# Patient Record
Sex: FEMALE | Race: BLACK OR AFRICAN AMERICAN | NOT HISPANIC OR LATINO | URBAN - METROPOLITAN AREA
[De-identification: names, ages, dates, MRNs, and addresses within clinical notes are randomized per-mention and may not be internally consistent; named-entity substitution may affect disease eponyms.]

---

## 2018-04-16 ENCOUNTER — HOSPITAL ENCOUNTER (EMERGENCY)
Facility: HOSPITAL | Age: 23
Discharge: HOME | End: 2018-04-16
Attending: EMERGENCY MEDICINE
Payer: COMMERCIAL

## 2018-04-16 VITALS
OXYGEN SATURATION: 99 % | SYSTOLIC BLOOD PRESSURE: 135 MMHG | BODY MASS INDEX: 28.44 KG/M2 | WEIGHT: 210 LBS | HEART RATE: 69 BPM | HEIGHT: 72 IN | DIASTOLIC BLOOD PRESSURE: 71 MMHG | TEMPERATURE: 97.8 F | RESPIRATION RATE: 16 BRPM

## 2018-04-16 DIAGNOSIS — F32.A DEPRESSION, UNSPECIFIED DEPRESSION TYPE: Primary | ICD-10-CM

## 2018-04-16 LAB
ANION GAP SERPL CALC-SCNC: 7 MEQ/L (ref 3–15)
APAP SERPL-MCNC: <10 UG/ML (ref 10–30)
BASOPHILS # BLD: 0.02 K/UL (ref 0.01–0.1)
BASOPHILS NFR BLD: 0.3 %
BUN SERPL-MCNC: 11 MG/DL (ref 8–20)
CALCIUM SERPL-MCNC: 9.1 MG/DL (ref 8.9–10.3)
CHLORIDE SERPL-SCNC: 105 MMOL/L (ref 98–109)
CO2 SERPL-SCNC: 25 MMOL/L (ref 22–32)
CREAT SERPL-MCNC: 1 MG/DL (ref 0.6–1.1)
DIFFERENTIAL METHOD BLD: ABNORMAL
EOSINOPHIL # BLD: 0.07 K/UL (ref 0.04–0.36)
EOSINOPHIL NFR BLD: 1.1 %
ERYTHROCYTE [DISTWIDTH] IN BLOOD BY AUTOMATED COUNT: 12.5 % (ref 11.7–14.4)
ETHANOL SERPL-MCNC: <5 MG/DL
GFR SERPL CREATININE-BSD FRML MDRD: >60 ML/MIN/1.73M*2
GLUCOSE SERPL-MCNC: 103 MG/DL (ref 70–99)
HCG UR QL: NEGATIVE
HCT VFR BLDCO AUTO: 40.9 % (ref 35–45)
HGB BLD-MCNC: 14.2 G/DL (ref 11.8–15.7)
IMM GRANULOCYTES # BLD AUTO: 0.02 K/UL (ref 0–0.08)
IMM GRANULOCYTES NFR BLD AUTO: 0.3 %
LYMPHOCYTES # BLD: 2.13 K/UL (ref 1.2–3.5)
LYMPHOCYTES NFR BLD: 34.9 %
MCH RBC QN AUTO: 33.4 PG (ref 28–33.2)
MCHC RBC AUTO-ENTMCNC: 34.7 G/DL (ref 32.2–35.5)
MCV RBC AUTO: 96.2 FL (ref 83–98)
MONOCYTES # BLD: 0.38 K/UL (ref 0.28–0.8)
MONOCYTES NFR BLD: 6.2 %
NEUTROPHILS # BLD: 3.48 K/UL (ref 1.7–7)
NEUTS SEG NFR BLD: 57.2 %
NRBC BLD-RTO: 0.2 %
PDW BLD AUTO: 10.8 FL (ref 9.4–12.3)
PLATELET # BLD AUTO: 176 K/UL (ref 150–369)
POTASSIUM SERPL-SCNC: 3.9 MMOL/L (ref 3.6–5.1)
RBC # BLD AUTO: 4.25 M/UL (ref 3.93–5.22)
SALICYLATES SERPL-MCNC: <4 MG/DL
SODIUM SERPL-SCNC: 137 MMOL/L (ref 136–144)
WBC # BLD AUTO: 6.1 K/UL (ref 3.8–10.5)

## 2018-04-16 PROCEDURE — 80048 BASIC METABOLIC PNL TOTAL CA: CPT | Performed by: PHYSICIAN ASSISTANT

## 2018-04-16 PROCEDURE — 84703 CHORIONIC GONADOTROPIN ASSAY: CPT | Performed by: PHYSICIAN ASSISTANT

## 2018-04-16 PROCEDURE — 36415 COLL VENOUS BLD VENIPUNCTURE: CPT | Performed by: PHYSICIAN ASSISTANT

## 2018-04-16 PROCEDURE — 85025 COMPLETE CBC W/AUTO DIFF WBC: CPT | Performed by: PHYSICIAN ASSISTANT

## 2018-04-16 PROCEDURE — 99285 EMERGENCY DEPT VISIT HI MDM: CPT

## 2018-04-16 PROCEDURE — G0480 DRUG TEST DEF 1-7 CLASSES: HCPCS | Performed by: PHYSICIAN ASSISTANT

## 2018-04-16 RX ORDER — BUPROPION HYDROCHLORIDE 150 MG/1
150 TABLET ORAL DAILY
COMMUNITY

## 2018-04-16 RX ORDER — FLUOXETINE HYDROCHLORIDE 20 MG/1
80 CAPSULE ORAL DAILY
COMMUNITY

## 2018-04-16 ASSESSMENT — ENCOUNTER SYMPTOMS
DEPRESSED MOOD: 1
DYSPHORIC MOOD: 0
EYE PAIN: 0
SHORTNESS OF BREATH: 0
ARTHRALGIAS: 0
HYPERSOMNIA: 0
NERVOUS/ANXIOUS: 0
FEELINGS OF WORTHLESSNESS: 0
INSOMNIA: 0
SORE THROAT: 0
HYPERACTIVE: 0
CHILLS: 0
FATIGUE: 0
IRRITABILITY: 0
HEADACHES: 0
AGITATION: 0
HYPERVENTILATION: 0
ABDOMINAL PAIN: 0
DYSURIA: 0
BACK PAIN: 0
APPETITE CHANGE: 0
EUPHORIC MOOD: 0
PALPITATIONS: 0
COUGH: 0
SLEEP DISTURBANCE: 0
POOR JUDGMENT: 0
VOMITING: 0
COLOR CHANGE: 0
FEVER: 0
HALLUCINATIONS: 0
DECREASED NEED FOR SLEEP: 0
SEIZURES: 0
CONFUSION: 0
HEMATURIA: 0
DECREASED CONCENTRATION: 0

## 2018-04-16 ASSESSMENT — COGNITIVE AND FUNCTIONAL STATUS - GENERAL
MOOD: ANXIOUS;DEPRESSED
AFFECT: RESTRICTED
PSYCHOMOTOR FUNCTIONING: WNL
PERCEPTUAL FUNCTION: NORMAL
ORIENTATION: FULLY ORIENTED
IMPULSE CONTROL: IMPETUOUS
SPEECH: SOFT
THOUGHT_CONTENT: APPROPRIATE
JUDGEMENT: INTACT
CURRENT VIOLENT THOUGHTS OR BEHAVIOR: PATIENT DENIES
APPEARANCE: WELL GROOMED
INSIGHT: INTACT

## 2018-04-16 NOTE — ED PROVIDER NOTES
HPI     Chief Complaint   Patient presents with   • Depression     Depression       History provided by:  Patient  Mental Health Problem   Presenting symptoms: depression and self-mutilation    Presenting symptoms: no agitation, no hallucinations and no suicidal thoughts    Patient accompanied by: roommate.  Degree of incapacity (severity):  Moderate  Onset quality:  Sudden  Duration:  1 day  Timing:  Constant  Progression:  Unchanged  Chronicity:  Recurrent  Context: not alcohol use, not drug abuse, not medication, not noncompliant and not recent medication change    Relieved by:  None tried  Worsened by:  Nothing  Associated symptoms: no abdominal pain, no anhedonia, no anxiety, no appetite change, no chest pain, no decreased need for sleep, not distractible, no euphoric mood, no fatigue, no feelings of worthlessness, no headaches, no hypersomnia, no hyperventilation, no insomnia, no irritability, no poor judgment and no school problems    Risk factors: no hx of suicide attempts      Patient states she was feeling overwhelmed and was therefore poking herself with her scissors in an attempt to harm herself, but not to kill herself. She reports history of suicidal ideation, most recently one year ago, but denies ever having plan to hurt herself.   Additionally, Pt reports having homicidal ideation toward her roommate (who is with her in Ed) one month ago, at which time she had plan to strangle her. Pt denies current HI and roommate, in room per patient's preference during my evaluation, is aware.     Patient History     Past Medical History:   Diagnosis Date   • Anemia    • Anxiety    • Depression        History reviewed. No pertinent surgical history.    Family History   Problem Relation Age of Onset   • Depression Mother    • Anxiety disorder Mother    • Depression Mother's Brother    • Drug abuse Mother's Brother        Social History   Substance Use Topics   • Smoking status: Never Smoker   • Smokeless tobacco:  Never Used   • Alcohol use Yes      Comment: The patient reports a history of occasional drinking stating she had two drinks three months ago.  She reported she first tried alcohol at the age of 17yo.        Systems Reviewed from Nursing Triage:          Review of Systems     Review of Systems   Constitutional: Negative for appetite change, chills, fatigue, fever and irritability.   HENT: Negative for ear pain and sore throat.    Eyes: Negative for pain and visual disturbance.   Respiratory: Negative for cough and shortness of breath.    Cardiovascular: Negative for chest pain and palpitations.   Gastrointestinal: Negative for abdominal pain and vomiting.   Genitourinary: Negative for dysuria and hematuria.   Musculoskeletal: Negative for arthralgias and back pain.   Skin: Negative for color change and rash.   Neurological: Negative for seizures, syncope and headaches.   Psychiatric/Behavioral: Positive for self-injury. Negative for agitation, behavioral problems, confusion, decreased concentration, dysphoric mood, hallucinations, sleep disturbance and suicidal ideas. The patient is not nervous/anxious, does not have insomnia and is not hyperactive.    All other systems reviewed and are negative.       Physical Exam     ED Triage Vitals [04/16/18 1412]   Temp Heart Rate Resp BP SpO2   36.6 °C (97.8 °F) 69 16 135/71 99 %      Temp Source Heart Rate Source Patient Position BP Location FiO2 (%) (Set)   Tympanic -- -- -- --                     Patient Vitals for the past 24 hrs:   BP Temp Temp src Pulse Resp SpO2 Height Weight   04/16/18 1412 135/71 36.6 °C (97.8 °F) Tympanic 69 16 99 % 1.829 m (6') 95.3 kg (210 lb)           Physical Exam   Constitutional: She is oriented to person, place, and time. She appears well-developed and well-nourished. No distress.   HENT:   Head: Normocephalic and atraumatic.   Eyes: Conjunctivae and EOM are normal. Pupils are equal, round, and reactive to light.   Neck: Normal range of  motion. Neck supple.   Cardiovascular: Normal rate, regular rhythm and normal heart sounds.    No murmur heard.  Pulmonary/Chest: Effort normal and breath sounds normal. No respiratory distress.   Abdominal: Soft. Bowel sounds are normal. She exhibits no distension. There is no tenderness. There is no guarding.   Musculoskeletal: Normal range of motion. She exhibits no edema.   Neurological: She is alert and oriented to person, place, and time.   Skin: Skin is warm and dry. Capillary refill takes less than 2 seconds.   Psychiatric: Her speech is normal. Judgment and thought content normal. Her affect is blunt. She is withdrawn. She is not actively hallucinating. Thought content is not paranoid and not delusional. Cognition and memory are normal. She expresses no homicidal and no suicidal ideation. She expresses no suicidal plans and no homicidal plans. She is attentive.   Nursing note and vitals reviewed.           Procedures    ED Course & MDM     Labs Reviewed   BASIC METABOLIC PANEL - Abnormal        Result Value    Glucose 103 (*)     Sodium 137      Potassium 3.9      Chloride 105      CO2 25      BUN 11      Creatinine 1.0      Calcium 9.1      eGFR >60.0      Anion Gap 7     ER TOXICOLOGY SCR, SERUM - Abnormal     Acetaminophen Level <10.0 (*)     Salicylate Lvl <4.0      Ethanol Lvl <5     CBC - Abnormal     MCH 33.4 (*)     WBC 6.10      RBC 4.25      Hemoglobin 14.2      Hematocrit 40.9      MCV 96.2      MCHC 34.7      RDW 12.5      Platelets 176      MPV 10.8     DIFF COUNT - Abnormal     nRBC 0.2 (*)     Differential Type Auto      Immature Granulocytes 0.3      Neutrophils 57.2      Lymphocytes 34.9      Monocytes 6.2      Eosinophils 1.1      Basophils 0.3      Immature Granulocytes, Absolute 0.02      Neutrophils, Absolute 3.48      Lymphocytes, Absolute 2.13      Monocytes, Absolute 0.38      Eosinophils, Absolute 0.07      Basophils, Absolute 0.02     BHCG, SERUM, QUAL - Normal    Preg Test, Serum  Negative     CBC AND DIFFERENTIAL    Narrative:     The following orders were created for panel order CBC and differential.  Procedure                               Abnormality         Status                     ---------                               -----------         ------                     CBC[[id number]]                           Abnormal            Final result               Diff Count[[id number]]                    Abnormal            Final result                 Please view results for these tests on the individual orders.       No orders to display           MDM         ED Course as of Apr 16 2144 Mon Apr 16, 2018   1450 D/w daniel Judge, she is aware of patient and will pass on to Otf at 3PM.   [KL]   1630 Patient has been evaluated by daniel Vanessa, and is set-up to begin IOP (MWF) at [facility] next Monday, April 23rd. Will d/c home at this time.  [KL]      ED Course User Index  [KL] TAMIKA Ford         Clinical Impressions as of Apr 16 2144   Depression, unspecified depression type     Disposition:  Discharge     TAMIKA Ford  04/16/18 2144

## 2018-04-16 NOTE — ED ATTESTATION NOTE
"I have personally seen and examined the patient.  I reviewed and agree with physician assistant / nurse practitioner’s assessment and plan of care, with the following exceptions: None  My examination, assessment, and plan of care of Migdalia Boss is as follows:    Presents with increasing depression, was poking self with scissors \"because I was disappointed that I wasn't handling the stress better\" denies Si. Reports 1 month ago, had HI towards her roommate, denies current thoughts of Hi. Roommate aware, and here with pt. Pt called her therapist today due to her increasing depression, and was told to come to ED for evaluation. Denies any SI/HI at this time, contracts for safetly.  Exam: Flattened affect, NCAT, PERRLA, EOMI  Extrem: Small punctate erythematous areas noted on right wrist, no violation of epidermis  Plan: Offered pt inpatient txt on voluntary basis, but pt declines, wishes for IOP. At this time, pt does not meet criteria for involuntary commitment. Evaluated by Psych SW today in Ed, and given referral info for IOP. D/w pt indications to return.     Melanie Murray MD  04/16/18 1900    "

## 2018-04-16 NOTE — DISCHARGE INSTRUCTIONS
Please return to the ED if you develop worsening symptoms, thoughts of hurting yourself or others, or any other concerning symptoms.    Follow up with outpatient program, as referred to by our crisis team.

## 2018-09-07 ENCOUNTER — LAB REQUISITION (OUTPATIENT)
Dept: LAB | Facility: HOSPITAL | Age: 23
End: 2018-09-07
Attending: INTERNAL MEDICINE
Payer: COMMERCIAL

## 2018-09-07 DIAGNOSIS — D50.0 IRON DEFICIENCY ANEMIA DUE TO CHRONIC BLOOD LOSS: ICD-10-CM

## 2018-09-07 LAB
BASOPHILS # BLD: 0.03 K/UL (ref 0.01–0.1)
BASOPHILS NFR BLD: 0.4 %
DIFFERENTIAL METHOD BLD: NORMAL
EOSINOPHIL # BLD: 0.14 K/UL (ref 0.04–0.36)
EOSINOPHIL NFR BLD: 2 %
ERYTHROCYTE [DISTWIDTH] IN BLOOD BY AUTOMATED COUNT: 12.2 % (ref 11.7–14.4)
HCT VFR BLDCO AUTO: 38.7 % (ref 35–45)
HGB BLD-MCNC: 13.4 G/DL (ref 11.8–15.7)
IMM GRANULOCYTES # BLD AUTO: 0.02 K/UL (ref 0–0.08)
IMM GRANULOCYTES NFR BLD AUTO: 0.3 %
LYMPHOCYTES # BLD: 2.41 K/UL (ref 1.2–3.5)
LYMPHOCYTES NFR BLD: 33.8 %
MCH RBC QN AUTO: 32.8 PG (ref 28–33.2)
MCHC RBC AUTO-ENTMCNC: 34.6 G/DL (ref 32.2–35.5)
MCV RBC AUTO: 94.9 FL (ref 83–98)
MONOCYTES # BLD: 0.52 K/UL (ref 0.28–0.8)
MONOCYTES NFR BLD: 7.3 %
NEUTROPHILS # BLD: 4.02 K/UL (ref 1.7–7)
NEUTS SEG NFR BLD: 56.2 %
NRBC BLD-RTO: 0 %
PDW BLD AUTO: 10.5 FL (ref 9.4–12.3)
PLATELET # BLD AUTO: 210 K/UL (ref 150–369)
RBC # BLD AUTO: 4.08 M/UL (ref 3.93–5.22)
WBC # BLD AUTO: 7.14 K/UL (ref 3.8–10.5)

## 2018-09-07 PROCEDURE — 85025 COMPLETE CBC W/AUTO DIFF WBC: CPT | Performed by: INTERNAL MEDICINE

## 2018-10-02 ENCOUNTER — HOSPITAL ENCOUNTER (EMERGENCY)
Facility: HOSPITAL | Age: 23
Discharge: HOME | End: 2018-10-02
Attending: EMERGENCY MEDICINE
Payer: COMMERCIAL

## 2018-10-02 VITALS
OXYGEN SATURATION: 96 % | WEIGHT: 215 LBS | BODY MASS INDEX: 29.12 KG/M2 | DIASTOLIC BLOOD PRESSURE: 62 MMHG | HEIGHT: 72 IN | SYSTOLIC BLOOD PRESSURE: 132 MMHG | HEART RATE: 76 BPM | TEMPERATURE: 97.9 F | RESPIRATION RATE: 18 BRPM

## 2018-10-02 DIAGNOSIS — M76.32 ILIOTIBIAL BAND SYNDROME, LEFT LEG: ICD-10-CM

## 2018-10-02 DIAGNOSIS — M79.605 LEFT LEG PAIN: Primary | ICD-10-CM

## 2018-10-02 LAB
BASOPHILS # BLD: 0.03 K/UL (ref 0.01–0.1)
BASOPHILS NFR BLD: 0.4 %
CRP SERPL-MCNC: <=6 MG/L
DIFFERENTIAL METHOD BLD: NORMAL
EOSINOPHIL # BLD: 0.07 K/UL (ref 0.04–0.36)
EOSINOPHIL NFR BLD: 1 %
ERYTHROCYTE [DISTWIDTH] IN BLOOD BY AUTOMATED COUNT: 12.9 % (ref 11.7–14.4)
HCT VFR BLDCO AUTO: 38.8 % (ref 35–45)
HGB BLD-MCNC: 13.3 G/DL (ref 11.8–15.7)
IMM GRANULOCYTES # BLD AUTO: 0.02 K/UL (ref 0–0.08)
IMM GRANULOCYTES NFR BLD AUTO: 0.3 %
LYMPHOCYTES # BLD: 2.47 K/UL (ref 1.2–3.5)
LYMPHOCYTES NFR BLD: 34.3 %
MCH RBC QN AUTO: 32.1 PG (ref 28–33.2)
MCHC RBC AUTO-ENTMCNC: 34.3 G/DL (ref 32.2–35.5)
MCV RBC AUTO: 93.7 FL (ref 83–98)
MONOCYTES # BLD: 0.58 K/UL (ref 0.28–0.8)
MONOCYTES NFR BLD: 8.1 %
NEUTROPHILS # BLD: 4.03 K/UL (ref 1.7–7)
NEUTS SEG NFR BLD: 55.9 %
NRBC BLD-RTO: 0 %
PDW BLD AUTO: 10.7 FL (ref 9.4–12.3)
PLATELET # BLD AUTO: 192 K/UL (ref 150–369)
RBC # BLD AUTO: 4.14 M/UL (ref 3.93–5.22)
WBC # BLD AUTO: 7.2 K/UL (ref 3.8–10.5)

## 2018-10-02 PROCEDURE — 86140 C-REACTIVE PROTEIN: CPT | Performed by: EMERGENCY MEDICINE

## 2018-10-02 PROCEDURE — 99283 EMERGENCY DEPT VISIT LOW MDM: CPT

## 2018-10-02 PROCEDURE — 85025 COMPLETE CBC W/AUTO DIFF WBC: CPT | Performed by: EMERGENCY MEDICINE

## 2018-10-02 PROCEDURE — 63700000 HC SELF-ADMINISTRABLE DRUG: Performed by: PHYSICIAN ASSISTANT

## 2018-10-02 PROCEDURE — 36415 COLL VENOUS BLD VENIPUNCTURE: CPT | Performed by: EMERGENCY MEDICINE

## 2018-10-02 RX ORDER — SODIUM FLUORIDE 1.1 G/100G
GEL ORAL
Refills: 4 | COMMUNITY
Start: 2018-08-22

## 2018-10-02 RX ORDER — IBUPROFEN 600 MG/1
600 TABLET ORAL ONCE
Status: COMPLETED | OUTPATIENT
Start: 2018-10-02 | End: 2018-10-02

## 2018-10-02 RX ORDER — TRAZODONE HYDROCHLORIDE 50 MG/1
1 TABLET ORAL NIGHTLY
Refills: 0 | COMMUNITY
Start: 2018-07-17 | End: 2019-04-11

## 2018-10-02 RX ADMIN — IBUPROFEN 600 MG: 600 TABLET ORAL at 07:56

## 2018-10-02 ASSESSMENT — ENCOUNTER SYMPTOMS
COLOR CHANGE: 0
WOUND: 0
FEVER: 0
CHILLS: 0
JOINT SWELLING: 0
WEAKNESS: 0
STIFFNESS: 0
MUSCLE WEAKNESS: 0
EXTREMITY NUMBNESS: 0
NUMBNESS: 0
FATIGUE: 0

## 2018-10-02 NOTE — ED PROVIDER NOTES
HPI     Chief Complaint   Patient presents with   • Knee Pain     L knee pain.  Had a cortisone shot in L knee last Friday, last night pain returned and woke pt from sleep.  Unable walk d/t pain     Pt is a 22yF with no pertinent PMH who presents to the ED with L knee/thigh pain. Pt reports she has been treated by her PCP and Dr. Ramos (referred by school) for IT band syndrome. She had injection performed by Dr. Ramos on Friday and states that she had been doing well since injection until this morning, when she awoke with significant pain and has had resultant difficulty weight bearing.     History provided by:  Patient  Knee Pain   Location:  Knee and leg  Injury: no    Leg location:  L upper leg  Pain details:     Quality:  Aching    Radiates to:  Does not radiate    Severity:  Moderate    Onset quality:  Sudden    Timing:  Constant    Progression:  Unchanged  Dislocation: no    Prior injury to area:  No  Relieved by:  None tried  Worsened by:  Nothing  Associated symptoms: decreased ROM (secondary to pain)    Associated symptoms: no fatigue, no fever, no itching, no muscle weakness, no numbness, no stiffness, no swelling and no tingling         Patient History     Past Medical History:   Diagnosis Date   • Anemia    • Anxiety    • Depression    • IT band syndrome        History reviewed. No pertinent surgical history.    Family History   Problem Relation Age of Onset   • Depression Mother    • Anxiety disorder Mother    • Depression Mother's Brother    • Drug abuse Mother's Brother        Social History   Substance Use Topics   • Smoking status: Never Smoker   • Smokeless tobacco: Never Used   • Alcohol use Yes      Comment: occ       Systems Reviewed from Nursing Triage:          Review of Systems     Review of Systems   Constitutional: Negative for chills, fatigue and fever.   Musculoskeletal: Negative for gait problem, joint swelling and stiffness.   Skin: Negative for color change, itching, pallor and wound.    Neurological: Negative for weakness and numbness.   All other systems reviewed and are negative.       Physical Exam     ED Triage Vitals   Temp Heart Rate Resp BP SpO2   10/02/18 0727 10/02/18 0727 10/02/18 0727 10/02/18 0727 10/02/18 0727   36.2 °C (97.2 °F) 71 16 137/86 99 %      Temp Source Heart Rate Source Patient Position BP Location FiO2 (%) (Set)   10/02/18 0727 10/02/18 0907 10/02/18 0727 10/02/18 0727 --   Tympanic Apical Sitting Right upper arm                      Patient Vitals for the past 24 hrs:   BP Temp Temp src Pulse Resp SpO2 Height Weight   10/02/18 0907 132/62 36.6 °C (97.9 °F) Tympanic 76 18 96 % - -   10/02/18 0727 137/86 36.2 °C (97.2 °F) Tympanic 71 16 99 % 1.829 m (6') 97.5 kg (215 lb)           Physical Exam   Constitutional: She is oriented to person, place, and time. She appears well-developed and well-nourished. No distress.   Musculoskeletal: Normal range of motion. She exhibits no edema.        Left hip: Normal. She exhibits normal range of motion, normal strength, no tenderness, no bony tenderness, no swelling, no crepitus, no deformity and no laceration.        Left knee: She exhibits normal range of motion (full AROM with mild discomfort), no swelling, no effusion, no ecchymosis, no deformity, no laceration, no erythema, normal alignment and no bony tenderness. No tenderness found.        Left upper leg: She exhibits tenderness (subjective, mild tenderness along IT band). She exhibits no bony tenderness, no swelling, no edema, no deformity and no laceration.        Legs:  Neurological: She is alert and oriented to person, place, and time.   Skin: Skin is warm and dry. Capillary refill takes less than 2 seconds.   Psychiatric: She has a normal mood and affect.   Nursing note and vitals reviewed.           Procedures    ED Course & MDM     Labs Reviewed   C-REACTIVE PROTEIN - Normal       Result Value    CRP <=6.00     CBC - Normal    WBC 7.20      RBC 4.14      Hemoglobin 13.3       Hematocrit 38.8      MCV 93.7      MCH 32.1      MCHC 34.3      RDW 12.9      Platelets 192      MPV 10.7     CBC AND DIFFERENTIAL    Narrative:     The following orders were created for panel order CBC and Differential.  Procedure                               Abnormality         Status                     ---------                               -----------         ------                     CBC[[id number]]                           Normal              Final result               Diff Count[[id number]]                                        Final result                 Please view results for these tests on the individual orders.   DIFF COUNT    Differential Type Auto      nRBC 0.0      Immature Granulocytes 0.3      Neutrophils 55.9      Lymphocytes 34.3      Monocytes 8.1      Eosinophils 1.0      Basophils 0.4      Immature Granulocytes, Absolute 0.02      Neutrophils, Absolute 4.03      Lymphocytes, Absolute 2.47      Monocytes, Absolute 0.58      Eosinophils, Absolute 0.07      Basophils, Absolute 0.03         No orders to display           MDM         ED Course as of Oct 02 0912   Tue Oct 02, 2018   0803 Imp: LLE pain s/p L knee injection.   Plan: CBC. CRP.   [KL]   0851 No acute findings on lab workup. Will d/c home with crutches, NSAIDs, to f/u with orthopedics.  [KL]      ED Course User Index  [KL] Theresa Campos PA         Clinical Impressions as of Oct 02 0912   Left leg pain   Iliotibial band syndrome, left leg     Disposition: Discharge       Theresa Campos PA  10/02/18 0913

## 2018-10-02 NOTE — ED NOTES
0910  Crutch training provided to pt by pct.  Pt discharged home     Sienna Little RN  10/02/18 0910

## 2018-10-02 NOTE — ED ATTESTATION NOTE
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  She had an injection last week by orthopedist and it helped for a day or so but has been hurting more and significantly more this morning without new injury.  He did practice on the leg yesterday.  Denies numbness tingling constitutional symptoms does not feel ill otherwise.  Examination: Alert pleasant young woman in no distress examination left leg shows some tenderness to palpation the lateral aspect of the left knee less so superior to that on the lateral aspect of the femur.  The left  knee is just ever so slightly warmer than the than the right.  There is no abscess, cellulitis she has full active range of motion with lateral knee pain.  Leg  is neurovascularly intact. Considered dvt, infection, others.      Martín Westfall MD  10/02/18 0963

## 2018-10-02 NOTE — DISCHARGE INSTRUCTIONS
Follow up with Dr. Ramos, regarding recent injection.    Please return to the ED if you develop worsening symptoms, pain uncontrolled by over the counter pain relievers, numbness, tingling, blue discoloration, or inability to move the affected extremity, or any other concerning symptoms.

## 2019-04-11 ENCOUNTER — HOSPITAL ENCOUNTER (EMERGENCY)
Facility: HOSPITAL | Age: 24
Discharge: HOME | End: 2019-04-11
Attending: EMERGENCY MEDICINE
Payer: COMMERCIAL

## 2019-04-11 VITALS
OXYGEN SATURATION: 98 % | WEIGHT: 230 LBS | DIASTOLIC BLOOD PRESSURE: 73 MMHG | HEART RATE: 73 BPM | RESPIRATION RATE: 16 BRPM | TEMPERATURE: 98.1 F | SYSTOLIC BLOOD PRESSURE: 140 MMHG | BODY MASS INDEX: 31.15 KG/M2 | HEIGHT: 72 IN

## 2019-04-11 DIAGNOSIS — F32.A DEPRESSION, UNSPECIFIED DEPRESSION TYPE: Primary | ICD-10-CM

## 2019-04-11 LAB
ANION GAP SERPL CALC-SCNC: 10 MEQ/L (ref 3–15)
APAP SERPL-MCNC: <10 UG/ML (ref 10–30)
BASOPHILS # BLD: 0.03 K/UL (ref 0.01–0.1)
BASOPHILS NFR BLD: 0.5 %
BUN SERPL-MCNC: 17 MG/DL (ref 8–20)
CALCIUM SERPL-MCNC: 8.9 MG/DL (ref 8.9–10.3)
CHLORIDE SERPL-SCNC: 105 MEQ/L (ref 98–109)
CO2 SERPL-SCNC: 22 MEQ/L (ref 22–32)
CREAT SERPL-MCNC: 0.9 MG/DL
DIFFERENTIAL METHOD BLD: NORMAL
EOSINOPHIL # BLD: 0.09 K/UL (ref 0.04–0.36)
EOSINOPHIL NFR BLD: 1.5 %
ERYTHROCYTE [DISTWIDTH] IN BLOOD BY AUTOMATED COUNT: 12.4 % (ref 11.7–14.4)
ETHANOL SERPL-MCNC: <5 MG/DL
GFR SERPL CREATININE-BSD FRML MDRD: >60 ML/MIN/1.73M*2
GLUCOSE SERPL-MCNC: 89 MG/DL (ref 70–99)
HCG UR QL: NEGATIVE
HCT VFR BLDCO AUTO: 42.8 %
HGB BLD-MCNC: 14.1 G/DL
IMM GRANULOCYTES # BLD AUTO: 0.01 K/UL (ref 0–0.08)
IMM GRANULOCYTES NFR BLD AUTO: 0.2 %
LYMPHOCYTES # BLD: 1.91 K/UL (ref 1.2–3.5)
LYMPHOCYTES NFR BLD: 31.9 %
MCH RBC QN AUTO: 31.8 PG (ref 28–33.2)
MCHC RBC AUTO-ENTMCNC: 32.9 G/DL (ref 32.2–35.5)
MCV RBC AUTO: 96.4 FL (ref 83–98)
MONOCYTES # BLD: 0.58 K/UL (ref 0.28–0.8)
MONOCYTES NFR BLD: 9.7 %
NEUTROPHILS # BLD: 3.37 K/UL (ref 1.7–7)
NEUTS SEG NFR BLD: 56.2 %
NRBC BLD-RTO: 0 %
PDW BLD AUTO: 10.6 FL (ref 9.4–12.3)
PLATELET # BLD AUTO: 193 K/UL
POTASSIUM SERPL-SCNC: 4.2 MEQ/L (ref 3.6–5.1)
RBC # BLD AUTO: 4.44 M/UL (ref 3.93–5.22)
SALICYLATES SERPL-MCNC: <4 MG/DL
SODIUM SERPL-SCNC: 137 MEQ/L (ref 136–144)
WBC # BLD AUTO: 5.99 K/UL

## 2019-04-11 PROCEDURE — 99283 EMERGENCY DEPT VISIT LOW MDM: CPT

## 2019-04-11 PROCEDURE — 84703 CHORIONIC GONADOTROPIN ASSAY: CPT | Performed by: PHYSICIAN ASSISTANT

## 2019-04-11 PROCEDURE — 85025 COMPLETE CBC W/AUTO DIFF WBC: CPT | Performed by: PHYSICIAN ASSISTANT

## 2019-04-11 PROCEDURE — G0480 DRUG TEST DEF 1-7 CLASSES: HCPCS | Performed by: PHYSICIAN ASSISTANT

## 2019-04-11 PROCEDURE — 36415 COLL VENOUS BLD VENIPUNCTURE: CPT | Performed by: PHYSICIAN ASSISTANT

## 2019-04-11 PROCEDURE — 80048 BASIC METABOLIC PNL TOTAL CA: CPT | Performed by: PHYSICIAN ASSISTANT

## 2019-04-11 RX ORDER — LAMOTRIGINE 25 MG/1
TABLET ORAL
Refills: 1 | COMMUNITY
Start: 2019-03-20

## 2019-04-11 ASSESSMENT — ENCOUNTER SYMPTOMS
BACK PAIN: 0
VOMITING: 0
ABDOMINAL PAIN: 0
SLEEP DISTURBANCE: 0
ARTHRALGIAS: 0
COUGH: 0
COLOR CHANGE: 0
PALPITATIONS: 0
DIZZINESS: 0
NERVOUS/ANXIOUS: 0
CONFUSION: 0
CHILLS: 0
FEVER: 0
DYSPHORIC MOOD: 1
HALLUCINATIONS: 0
LIGHT-HEADEDNESS: 0
SHORTNESS OF BREATH: 0
AGITATION: 0

## 2019-04-11 ASSESSMENT — COGNITIVE AND FUNCTIONAL STATUS - GENERAL
AFFECT: FLAT
IMPULSE CONTROL: SPONTANEOUS
MOOD: DEPRESSED
SPEECH: SOFT
THOUGHT_CONTENT: APPROPRIATE
JUDGEMENT: INTACT
PSYCHOMOTOR FUNCTIONING: WNL
APPEARANCE: WELL GROOMED
INSIGHT: INTACT
PERCEPTUAL FUNCTION: NORMAL
ORIENTATION: FULLY ORIENTED

## 2019-04-11 NOTE — DISCHARGE INSTRUCTIONS
FOLLOW UP WITH OUTPATIENT RESOURCES AS DISCUSSED WITH SOCIAL WORK    RETURN TO ER AT ANY TIE IF DEVELOP THOUGHTS OF HARMING SELF/OTHERS OR FEELING UNSAFE

## 2019-04-11 NOTE — ED PROVIDER NOTES
HPI     Chief Complaint   Patient presents with   • Psychiatric Evaluation     Pt reports she is here for a psych eval sent by [facility] for thoughts of self harm. Pt reports she is depressed but denies suicidal thoughts       Pt is a 22 y/o female, PMHx of Depression, anxiety, anemia, p/w c/o thoughts of self harm. Pt has hx of depression and prior self harm (cutting). She has made no physical attempts of suicide in the past. She has never been hospitalized in the past for psych. Denies drugs or alcohol. Denies hallucinations/delusions. No medical complaints.              Patient History     Past Medical History:   Diagnosis Date   • Anemia    • Anxiety    • Depression    • IT band syndrome        History reviewed. No pertinent surgical history.    Family History   Problem Relation Age of Onset   • Depression Mother    • Anxiety disorder Mother    • Depression Mother's Brother    • Drug abuse Mother's Brother        Social History   Substance Use Topics   • Smoking status: Never Smoker   • Smokeless tobacco: Never Used   • Alcohol use Yes      Comment: occ       Systems Reviewed from Nursing Triage:          Review of Systems     Review of Systems   Constitutional: Negative for chills and fever.   Respiratory: Negative for cough and shortness of breath.    Cardiovascular: Negative for chest pain and palpitations.   Gastrointestinal: Negative for abdominal pain and vomiting.   Musculoskeletal: Negative for arthralgias and back pain.   Skin: Negative for color change and rash.   Neurological: Negative for dizziness, syncope and light-headedness.   Psychiatric/Behavioral: Positive for dysphoric mood and self-injury. Negative for agitation, confusion, hallucinations, sleep disturbance and suicidal ideas. The patient is not nervous/anxious.    All other systems reviewed and are negative.       Physical Exam     ED Triage Vitals [04/11/19 1622]   Temp Heart Rate Resp BP SpO2   36.7 °C (98.1 °F) 73 16 140/73 98 %       Temp Source Heart Rate Source Patient Position BP Location FiO2 (%) (Set)   Tympanic -- -- -- --       Pulse Ox %: 98 % (04/11/19 1749)  Pulse Ox Interpretation: Normal (04/11/19 1749)          Patient Vitals for the past 24 hrs:   BP Temp Temp src Pulse Resp SpO2 Height Weight   04/11/19 1622 140/73 36.7 °C (98.1 °F) Tympanic 73 16 98 % 1.829 m (6') 104 kg (230 lb)           Physical Exam   Constitutional: She is oriented to person, place, and time. She appears well-developed and well-nourished. No distress.   HENT:   Head: Normocephalic and atraumatic.   Eyes: Pupils are equal, round, and reactive to light. Conjunctivae and EOM are normal.   Neck: Normal range of motion. Neck supple.   Cardiovascular: Normal rate, regular rhythm and normal heart sounds.    Pulmonary/Chest: Effort normal and breath sounds normal. No respiratory distress.   Neurological: She is alert and oriented to person, place, and time.   Skin: Skin is warm and dry. Capillary refill takes less than 2 seconds.   Psychiatric: Her speech is normal and behavior is normal. Judgment normal. She is not actively hallucinating. Cognition and memory are normal. She exhibits a depressed mood. She expresses no homicidal and no suicidal ideation. She expresses no suicidal plans and no homicidal plans. She is attentive.   Nursing note and vitals reviewed.           Procedures    ED Course & MDM     Labs Reviewed   ER TOXICOLOGY SCR, SERUM - Abnormal        Result Value    Salicylate <4.0      Acetaminophen <10.0 (*)     Ethanol <5     BHCG, SERUM, QUAL - Normal    Preg Test, Serum Negative     BASIC METABOLIC PANEL - Normal    Sodium 137      Potassium 4.2      Chloride 105      CO2 22      BUN 17      Creatinine 0.9      Glucose 89      Calcium 8.9      eGFR >60.0      Anion Gap 10     CBC - Normal    WBC 5.99      RBC 4.44      Hemoglobin 14.1      Hematocrit 42.8      MCV 96.4      MCH 31.8      MCHC 32.9      RDW 12.4      Platelets 193      MPV 10.6      CBC AND DIFFERENTIAL    Narrative:     The following orders were created for panel order CBC and differential.  Procedure                               Abnormality         Status                     ---------                               -----------         ------                     CBC[[id number]]                           Normal              Final result               Diff Count[[id number]]                                        Final result                 Please view results for these tests on the individual orders.   DIFF COUNT    Differential Type Auto      nRBC 0.0      Immature Granulocytes 0.2      Neutrophils 56.2      Lymphocytes 31.9      Monocytes 9.7      Eosinophils 1.5      Basophils 0.5      Immature Granulocytes, Absolute 0.01      Neutrophils, Absolute 3.37      Lymphocytes, Absolute 1.91      Monocytes, Absolute 0.58      Eosinophils, Absolute 0.09      Basophils, Absolute 0.03     DRUG SCREEN PANEL, URINE       No orders to display         MDM  4:40 PM  Screening Labs, crisis eval    6:00 PM  Pt medically cleared. SW evaluate pt and plan for PHP or IOP outpatient. Pt not suicidal in ED. Feels safe with dc         Clinical Impressions as of Apr 11 1851   Depression, unspecified depression type        Ced Alcocer, TAMIKA JACKSON  04/11/19 1852

## 2019-04-11 NOTE — ED ATTESTATION NOTE
I have personally seen and examined the patient.  I reviewed and agree with physician assistant / nurse practitioner’s assessment and plan of care, with the following exceptions: None  My examination, assessment, and plan of care of Migdalia Boss is as follows:       No hi/si  No prior suicide attempts  Has cut before  Seen by crisis and given IOP     Tate Cary, DO  04/11/19 4847